# Patient Record
Sex: FEMALE | Race: AMERICAN INDIAN OR ALASKA NATIVE
[De-identification: names, ages, dates, MRNs, and addresses within clinical notes are randomized per-mention and may not be internally consistent; named-entity substitution may affect disease eponyms.]

---

## 2022-02-18 ENCOUNTER — HOSPITAL ENCOUNTER (EMERGENCY)
Dept: HOSPITAL 5 - ED | Age: 32
Discharge: HOME | End: 2022-02-18
Payer: MEDICAID

## 2022-02-18 VITALS — SYSTOLIC BLOOD PRESSURE: 117 MMHG | DIASTOLIC BLOOD PRESSURE: 73 MMHG

## 2022-02-18 DIAGNOSIS — O21.1: Primary | ICD-10-CM

## 2022-02-18 DIAGNOSIS — Z79.899: ICD-10-CM

## 2022-02-18 DIAGNOSIS — Z3A.08: ICD-10-CM

## 2022-02-18 LAB
ALBUMIN SERPL-MCNC: 5.2 G/DL (ref 3.9–5)
ALT SERPL-CCNC: 20 UNITS/L (ref 7–56)
BASOPHILS # (AUTO): 0 K/MM3 (ref 0–0.1)
BASOPHILS NFR BLD AUTO: 0.1 % (ref 0–1.8)
BUN SERPL-MCNC: 27 MG/DL (ref 7–17)
BUN/CREAT SERPL: 34 %
CALCIUM SERPL-MCNC: 10.8 MG/DL (ref 8.4–10.2)
EOSINOPHIL # BLD AUTO: 0 K/MM3 (ref 0–0.4)
EOSINOPHIL NFR BLD AUTO: 0 % (ref 0–4.3)
HCT VFR BLD CALC: 46.2 % (ref 30.3–42.9)
HEMOLYSIS INDEX: 4
HGB BLD-MCNC: 14.8 GM/DL (ref 10.1–14.3)
LYMPHOCYTES # BLD AUTO: 1.4 K/MM3 (ref 1.2–5.4)
LYMPHOCYTES NFR BLD AUTO: 7.1 % (ref 13.4–35)
MCHC RBC AUTO-ENTMCNC: 32 % (ref 30–34)
MCV RBC AUTO: 90 FL (ref 79–97)
MONOCYTES # (AUTO): 0.8 K/MM3 (ref 0–0.8)
MONOCYTES % (AUTO): 4.1 % (ref 0–7.3)
PLATELET # BLD: 286 K/MM3 (ref 140–440)
RBC # BLD AUTO: 5.14 M/MM3 (ref 3.65–5.03)

## 2022-02-18 PROCEDURE — 99283 EMERGENCY DEPT VISIT LOW MDM: CPT

## 2022-02-18 PROCEDURE — 96361 HYDRATE IV INFUSION ADD-ON: CPT

## 2022-02-18 PROCEDURE — 83690 ASSAY OF LIPASE: CPT

## 2022-02-18 PROCEDURE — 80053 COMPREHEN METABOLIC PANEL: CPT

## 2022-02-18 PROCEDURE — 96375 TX/PRO/DX INJ NEW DRUG ADDON: CPT

## 2022-02-18 PROCEDURE — 96374 THER/PROPH/DIAG INJ IV PUSH: CPT

## 2022-02-18 PROCEDURE — 36415 COLL VENOUS BLD VENIPUNCTURE: CPT

## 2022-02-18 PROCEDURE — 85025 COMPLETE CBC W/AUTO DIFF WBC: CPT

## 2022-02-18 NOTE — EMERGENCY DEPARTMENT REPORT
ED Female  HPI





- General


Chief complaint: Nausea/Vomiting/Diarrhea


Stated complaint: NAUSEA,VOMITTING


Time Seen by Provider: 22 10:53


Source: patient


Mode of arrival: Ambulatory


Limitations: No Limitations





- History of Present Illness


Initial comments: 





31-year-old -American female presents to the emergency room reporting 

that she has a history of hyperemesis gravidarum with her last pregnancies.  She

states that she is currently 8 weeks pregnant.  She is  4 para 2 with 1 

miscarriage recently.  Patient is followed by my OB in [place].  She denies 

any vaginal bleeding no vaginal discharge no pelvic pain.  She states that in t

he past she has been on multiple antiemetics.  Her last menstrual period was 

2021


Onset/Timin


-: week(s)


Severity scale (0 -10): 2


Consistency: intermittent


Improves with: none


Worsens with: none


Are you Pregnant Now?: Yes


Last Menstrual Period: 21


EDC: 10/03/22


Associated Symptoms: abdominal pain (Epigastric from vomiting)





- Related Data


Sexually active: Yes


: 4


Para: 1


A: 1


                                  Previous Rx's











 Medication  Instructions  Recorded  Last Taken  Type


 


Doxylamine Succinate [Unisom] 25 mg PO BID PRN #20 22 Unknown Rx


 


Fariba Root [Fariba] 250 mg PO QID PRN #40 cap 22 Unknown Rx


 


Pyridoxine [Vitamin B-6 50MG TAB] 50 mg PO DAILY #30 tab 22 Unknown Rx











                                    Allergies











Allergy/AdvReac Type Severity Reaction Status Date / Time


 


No Known Allergies Allergy   Verified 22 10:23














ED Review of Systems


ROS: 


Stated complaint: NAUSEA,VOMITTING


Other details as noted in HPI





Comment: All other systems reviewed and negative


Gastrointestinal: nausea, vomiting





ED Past Medical Hx





- Medications


Home Medications: 


                                Home Medications











 Medication  Instructions  Recorded  Confirmed  Last Taken  Type


 


Doxylamine Succinate [Unisom] 25 mg PO BID PRN #20 22  Unknown Rx


 


Fariba Root [Fariba] 250 mg PO QID PRN #40 cap 22  Unknown Rx


 


Pyridoxine [Vitamin B-6 50MG TAB] 50 mg PO DAILY #30 tab 22  Unknown Rx














ED Physical Exam





- General


Limitations: No Limitations


General appearance: alert, in no apparent distress





- Head


Head exam: Present: atraumatic, normocephalic





- Eye


Eye exam: Present: normal appearance





- ENT


ENT exam: Present: mucous membranes moist





- Neck


Neck exam: Present: normal inspection





- Respiratory


Respiratory exam: Present: normal lung sounds bilaterally.  Absent: respiratory 

distress





- Cardiovascular


Cardiovascular Exam: Present: regular rate, normal rhythm.  Absent: systolic 

murmur, diastolic murmur, rubs, gallop





- GI/Abdominal


GI/Abdominal exam: Present: soft, normal bowel sounds





- Extremities Exam


Extremities exam: Present: normal inspection





- Back Exam


Back exam: Present: normal inspection





- Neurological Exam


Neurological exam: Present: alert, oriented X3





- Psychiatric


Psychiatric exam: Present: normal affect, normal mood





- Skin


Skin exam: Present: warm, dry, intact, normal color.  Absent: rash





ED Course


                                   Vital Signs











  22





  10:25 13:30 13:38


 


Temperature 98.4 F  98.0 F


 


Pulse Rate 130 H  115 H


 


Respiratory 18 17 20





Rate   


 


Blood Pressure 115/76  


 


Blood Pressure   99/47





[Right]   


 


O2 Sat by Pulse 100  99





Oximetry   














  22





  14:14


 


Temperature 98.2 F


 


Pulse Rate 106 H


 


Respiratory 18





Rate 


 


Blood Pressure 


 


Blood Pressure 94/67





[Right] 


 


O2 Sat by Pulse 100





Oximetry 














- Reevaluation(s)


Reevaluation #1: 





22 15:01


Patient reports that she feels much better after having the second liter of 

fluids and Reglan.





ED Medical Decision Making





- Lab Data


Result diagrams: 


                                 22 11:15





                                 22 11:15





- Medical Decision Making





31-year-old -American female presents to the emergency room reporting 

that she has a history of hyperemesis gravidarum with her last pregnancies.  She

 states that she is currently 8 weeks pregnant.  She is  4 para 2 with 1 

miscarriage recently.  Patient is followed by my OB in [place].  She denies 

any vaginal bleeding no vaginal discharge no pelvic pain.  She states that in 

the past she has been on multiple antiemetics.  Her last menstrual period was 

2021





CBC CMP lipase urinalysis has been ordered.


Critical care attestation.: 


If time is entered above; I have spent that time in minutes in the direct care 

of this critically ill patient, excluding procedure time.








ED Disposition


Clinical Impression: 


 Hyperemesis gravidarum before end of 22 week gestation with carbohydrate 

depletion





Disposition:  HOME / SELF CARE / HOMELESS


Is pt being admited?: No


Does the pt Need Aspirin: No


Condition: Stable


Instructions:  Hyperemesis Gravidarum, Morning Sickness


Additional Instructions: 


Please take medications as prescribed.  Follow-up with your OB/GYN.


Prescriptions: 


Fariba Root [Fariba] 250 mg PO QID PRN #40 cap


 PRN Reason: Vomiting


Doxylamine Succinate [Unisom] 25 mg PO BID PRN #20


 PRN Reason: Nausea And Vomiting


Pyridoxine [Vitamin B-6 50MG TAB] 50 mg PO DAILY #30 tab


Referrals: 


[facility] [Primary Care Provider] - 3-5 Days


[facility] [Provider Group] - 3-5 Days


Time of Disposition: 15:05

## 2022-03-28 ENCOUNTER — HOSPITAL ENCOUNTER (INPATIENT)
Dept: HOSPITAL 5 - 3A | Age: 32
LOS: 2 days | Discharge: HOME | DRG: 781 | End: 2022-03-30
Attending: OBSTETRICS & GYNECOLOGY | Admitting: OBSTETRICS & GYNECOLOGY
Payer: MEDICAID

## 2022-03-28 ENCOUNTER — HOSPITAL ENCOUNTER (EMERGENCY)
Dept: HOSPITAL 5 - ED | Age: 32
Discharge: HOME | End: 2022-03-28
Payer: MEDICAID

## 2022-03-28 VITALS — SYSTOLIC BLOOD PRESSURE: 121 MMHG | DIASTOLIC BLOOD PRESSURE: 73 MMHG

## 2022-03-28 DIAGNOSIS — Z3A.13: ICD-10-CM

## 2022-03-28 DIAGNOSIS — O21.1: Primary | ICD-10-CM

## 2022-03-28 DIAGNOSIS — O99.281: ICD-10-CM

## 2022-03-28 DIAGNOSIS — E05.90: ICD-10-CM

## 2022-03-28 DIAGNOSIS — O21.9: Primary | ICD-10-CM

## 2022-03-28 DIAGNOSIS — Z53.21: ICD-10-CM

## 2022-03-28 LAB
ALBUMIN SERPL-MCNC: 4.3 G/DL (ref 3.9–5)
ALT SERPL-CCNC: 12 UNITS/L (ref 7–56)
BASOPHILS # (AUTO): 0 K/MM3 (ref 0–0.1)
BASOPHILS NFR BLD AUTO: 0.1 % (ref 0–1.8)
BUN SERPL-MCNC: 11 MG/DL (ref 7–17)
BUN/CREAT SERPL: 28 %
CALCIUM SERPL-MCNC: 9.8 MG/DL (ref 8.4–10.2)
EOSINOPHIL # BLD AUTO: 0 K/MM3 (ref 0–0.4)
EOSINOPHIL NFR BLD AUTO: 0 % (ref 0–4.3)
HCT VFR BLD CALC: 32.4 % (ref 30.3–42.9)
HEMOLYSIS INDEX: 0
HGB BLD-MCNC: 10.1 GM/DL (ref 10.1–14.3)
LYMPHOCYTES # BLD AUTO: 1 K/MM3 (ref 1.2–5.4)
LYMPHOCYTES NFR BLD AUTO: 6.8 % (ref 13.4–35)
MCHC RBC AUTO-ENTMCNC: 31 % (ref 30–34)
MCV RBC AUTO: 95 FL (ref 79–97)
MONOCYTES # (AUTO): 0.5 K/MM3 (ref 0–0.8)
MONOCYTES % (AUTO): 3.4 % (ref 0–7.3)
PLATELET # BLD: 327 K/MM3 (ref 140–440)
RBC # BLD AUTO: 3.41 M/MM3 (ref 3.65–5.03)

## 2022-03-28 PROCEDURE — 86901 BLOOD TYPING SEROLOGIC RH(D): CPT

## 2022-03-28 PROCEDURE — 86900 BLOOD TYPING SEROLOGIC ABO: CPT

## 2022-03-28 PROCEDURE — 81001 URINALYSIS AUTO W/SCOPE: CPT

## 2022-03-28 PROCEDURE — 80053 COMPREHEN METABOLIC PANEL: CPT

## 2022-03-28 PROCEDURE — 87086 URINE CULTURE/COLONY COUNT: CPT

## 2022-03-28 PROCEDURE — 84443 ASSAY THYROID STIM HORMONE: CPT

## 2022-03-28 PROCEDURE — 85025 COMPLETE CBC W/AUTO DIFF WBC: CPT

## 2022-03-28 PROCEDURE — 86850 RBC ANTIBODY SCREEN: CPT

## 2022-03-28 PROCEDURE — U0003 INFECTIOUS AGENT DETECTION BY NUCLEIC ACID (DNA OR RNA); SEVERE ACUTE RESPIRATORY SYNDROME CORONAVIRUS 2 (SARS-COV-2) (CORONAVIRUS DISEASE [COVID-19]), AMPLIFIED PROBE TECHNIQUE, MAKING USE OF HIGH THROUGHPUT TECHNOLOGIES AS DESCRIBED BY CMS-2020-01-R: HCPCS

## 2022-03-28 PROCEDURE — 36415 COLL VENOUS BLD VENIPUNCTURE: CPT

## 2022-03-28 PROCEDURE — 80048 BASIC METABOLIC PNL TOTAL CA: CPT

## 2022-03-28 PROCEDURE — 82010 KETONE BODYS QUAN: CPT

## 2022-03-28 RX ADMIN — PROMETHAZINE HYDROCHLORIDE SCH MG: 25 SUPPOSITORY RECTAL at 23:31

## 2022-03-28 RX ADMIN — DEXTROSE, SODIUM CHLORIDE, SODIUM LACTATE, POTASSIUM CHLORIDE, AND CALCIUM CHLORIDE SCH MLS/HR: 5; .6; .31; .03; .02 INJECTION, SOLUTION INTRAVENOUS at 23:32

## 2022-03-29 LAB
BILIRUB UR QL STRIP: (no result)
BLOOD UR QL VISUAL: (no result)
MUCOUS THREADS #/AREA URNS HPF: (no result) /HPF
PH UR STRIP: 6 [PH] (ref 5–7)
RBC #/AREA URNS HPF: 9 /HPF (ref 0–6)
UROBILINOGEN UR-MCNC: 2 MG/DL (ref ?–2)
WBC #/AREA URNS HPF: 15 /HPF (ref 0–6)

## 2022-03-29 RX ADMIN — PROMETHAZINE HYDROCHLORIDE SCH MG: 25 SUPPOSITORY RECTAL at 16:47

## 2022-03-29 RX ADMIN — PROMETHAZINE HYDROCHLORIDE SCH MG: 25 SUPPOSITORY RECTAL at 10:45

## 2022-03-29 RX ADMIN — Medication SCH EACH: at 10:45

## 2022-03-29 RX ADMIN — DEXTROSE, SODIUM CHLORIDE, SODIUM LACTATE, POTASSIUM CHLORIDE, AND CALCIUM CHLORIDE SCH MLS/HR: 5; .6; .31; .03; .02 INJECTION, SOLUTION INTRAVENOUS at 02:03

## 2022-03-29 RX ADMIN — PROMETHAZINE HYDROCHLORIDE SCH MG: 25 SUPPOSITORY RECTAL at 05:00

## 2022-03-29 NOTE — HISTORY AND PHYSICAL REPORT
History of Present Illness


Date of examination: 22


Date of admission: 


22 22:41





Chief complaint: 


I have not been able to keep down water since  morning. 





History of present illness: 


Pt is a  @ 13.1 wks who states that she has not been able to keep down 

solids and liquids since  morning. Of Note: Pt admitted from the ER under 

a different account number ([id number]). Her K+ in the ER this admission was 

4.1.  Per patient she is not able to urinate and when she does it is very dark 

in color. She was previously admitted about 3 weeks ago () for 

hyperemesis and was diagnosed with hyperthyroidism during that admission. Has 

endocrinology appointment on . Has not called [facility] for an appointment. 

States that she would like to wait until she sees the endocrinologist so that 

she can go the [facility] appointment with the information about her thyroid disorder.

 Has not been taking medications since a week after discharge because she states

that she felt better. Last took propranolol and PTU on  and has 

not taken them since that time. States that her heart rate had decreased and she

felt better and that is why she stopped taking her medication. 





EDC Calculations 


LMP: 10/03/2022


Gestational Age: 13.1  weeks on admission





Past Pregnancy History 


   :      4


   Term Births:      2


   Premature Births:   0


   Living Children:   2


   Para:         2


   Mult. Births:      0


   Prev :   0


   Prev.  attempt?   0


   Aborta:      1


   Elect. Ab:      0


   Spont. Ab:      1


   Ectopics:      0





Pregnancy # 1


   Delivery date:     


   Weeks Gestation:   38


    labor:     no


   Delivery type:     


   Anesthesia type:     none


   Infant Sex:      Female


   Birth weight:      5lbs?oz


   Comments:      HG





Pregnancy # 2


   Delivery date:     


   Weeks Gestation:   38


   Delivery type:     


   Anesthesia type:     none


   Infant Sex:      Male


   Birth weight:      6lbs


   Comments:      HG





Pregnancy # 3


   Delivery date:     2021


   Weeks Gestation:   18


   Delivery type:     SAB


   Anesthesia type:     general


   Delivery location:     [place]


   Comments:      HG, D&C








Past Medical History:


   Reviewed and updated today:


      Ovarian Cysts





Past Surgical History:


   Reviewed and updated today:


      negative











Family History Summary: 


MGF - Has Family History of Prostate Cancer - Entered On: 2022


MGF - Has Family History of Hypertension - Entered On: 2022


MGM - Has Family History of Hypertension - Entered On: 2022


MGF - Has Family History of Diabetes - Entered On: 2022


MGM - Has Family History of Diabetes - Entered On: 2022








Social History:


   Patient is single


   


   Smoking History:


   Patient has never smoked.








Risk Factors: 


Smoked Tobacco Use:  Never smoker


Smokeless Tobacco Use:  Never


Counseled to Quit/Cut Down:  yes


Passive Smoke Exposure:  no


HIV High Risk Behavior:  low risk


Caffeine Use:  0 drinks per day


Exercise:  no


Seatbelt Use:  preg- %





No Dietary Counseling Reason: pn yes





Alcohol Use:  yes


   Type:  occ prior to preg.





Drug Use:  no








Past Medical History 


Anesthesia Complications: negative


Anemia: negative


Autoimmune Disorder: negative


Bleeding Disorder: negative


Blood Transfusions: negative


Breast Disease: negative


Diabetes: negative


Heart Disease: negative


Hypertension: negative


Hepatitis/Liver Disease: negative


Kidney Disease/UTI: negative


Neurologic/Epilepsy/Migraines: negative


Phlebitis/Varicosities: negative


Psychiatric: negative


Pulmonary Disease/Asthma: negative


Thyroid Disease: negative


Hospitalizations: negative


Surgery (Non-gyn): negative


Abnormal PAP: negative


MONIE Exposure: negative


Infertility: negative


Uterine Anomaly: negative


Uterine Surgery (not C/S): negative


Other Gynecologic Problems: negative





Social Hx: Patient is single





Smoking History:


Patient has never smoked.








Infection History 


Hx of STD: none


HIV Risk Eval: low risk


Hepatitis B Risk Eval: low risk


Rash, Viral, or Febrile illness since last LMP? no


Varicella/Chicken Pox Status: Previous Disease


TB Risk: no





Genetic History 


 Congenital Heart Defect:


    Mom: no  Dad: no


Canavan Disease:


    Mom: no  Dad: no


Thalassemia


    Mom: no  Dad: no


Neural Tube Defect


    Mom: no  Dad: no


Down's Syndrome


    Mom: no  Dad: no


Shashi-Sachs


    Mom: no  Dad: no


Sickle Cell Disease/Trait


    Mom: no  Dad: no


Hemophilia


    Mom: no  Dad: no


Muscular Dystrophy


    Mom: no  Dad: no


Cystic Fibrosis


    Mom: no  Dad: no


Randolph Chorea


    Mom: no  Dad: no


Mental Retardation


    Mom: no  Dad: no


Fragile X


    Mom: no  Dad: no


Other Genetic/Chromosomal Disorder


    Mom: no  Dad: no


Child w/other birth defect


    Mom: no  Dad: no





Enviromental Exposures 


 Enviromental Exposures Reviewed


Xray Exposure: no


Medication, drug, or alcohol use since LMP: no


Chemical/Other Exposure: no


Exposure to Cat Liter: no


Hx of Parvovirus (Fifth Disease): no


Occupational Exposure to Children: none


Comments:  Unemployed


Active Medications (reviewed today):


None





Current Allergies (reviewed today):


No known allergies











Past History


Past Medical History: thyroid disease (Hyperthyroidism)


Past Surgical History: no surgical history


Family/Genetic History: none


Social history: no significant social history





- Obstetrical History


Expected Date of Delivery: 10/03/22


Actual Gestation: 13 Week(s) 1 Day(s) 


: 4


Para: 2


Hx # Term Pregnancies: 2


Number of  Pregnancies: 0


Spontaneous Abortions: 1


Induced : 0


Number of Living Children: 2





Medications and Allergies


                                    Allergies











Allergy/AdvReac Type Severity Reaction Status Date / Time


 


No Known Allergies Allergy   Verified 22 10:23











                                Home Medications











 Medication  Instructions  Recorded  Confirmed  Last Taken  Type


 


Doxylamine Succinate [Unisom] 25 mg PO BID PRN #20 22 

22:00 Rx


 


Fariba Root [Fariba] 250 mg PO QID PRN #40 cap 22 18:00 

Rx


 


Pyridoxine [Vitamin B-6 50MG TAB] 50 mg PO DAILY #30 tab 22 

Unknown Rx


 


Metoclopramide [Reglan] 10 mg PO TID #60 tab 22 10:00 Rx


 


Ondansetron [Zofran ODT TAB] 8 mg PO Q12HR #15 tab.rapdis 22 21:00 Rx


 


Promethazine [Phenergan SUPPOS] 25 mg NV Q6HR PRN #15 supp.rect 22 10:00 Rx


 


propranoloL [Inderal] 20 mg PO BID #60 tablet 22 10:00 

Rx


 


propylthiouraciL [Propylthiouracil] 50 mg PO TID #90 tab 22 10:00 Rx











Active Meds: 


Active Medications





Dextrose/Lactated Ringer's (D5lr)  1,000 mls @ 500 mls/hr IV AS DIRECT HIMANSHU


   Stop: 22 23:59


   Last Admin: 22 23:32 Dose:  500 mls/hr


   


Metoclopramide HCl (Metoclopramide 10 Mg/2 Ml Inj)  10 mg IV Q6H PRN


   PRN Reason: Nausea And Vomiting


Multivitamins/Iron/Calcium (Prenatal Jfb04-Vb Fumarate-Folic Acid Vit Tab)  1 

each PO QDAY [facility]


Ondansetron HCl (Ondansetron 4 Mg/2 Ml Inj)  4 mg IV Q6H PRN


   PRN Reason: N/V unrelieved by Reglan


Promethazine HCl (Promethazine 25 Mg Rect Supp)  25 mg NV Q6H [facility]


   Last Admin: 22 23:31 Dose:  25 mg


   


Propylthiouracil (Propylthiouracil 50 Mg Tab)  50 mg PO TID [facility]











Review of Systems


All systems: negative


Gastrointestinal: nausea, vomiting





- Vital Signs


Vital signs: 


                                   Vital Signs











Pulse Ox


 


 99 


 


 22 23:00








                                        











Temp Pulse Resp BP Pulse Ox


 


             99 


 


             22 23:00








Pt denies vaginal bleeding. Pale and dry mucus membranes noted.  





- Physical Exam


Breasts: Positive: deferred


Cardiovascular: Normal S1, Normal S2


Lungs: Positive: Clear to auscultation


Abdomen: Positive: normal appearance, soft, other (Hypoactive bowel sounds)


Uterus: Positive: normal size, normal contour


Extremities: Positive: normal





Results


All other labs normal.








HBsAg Screen              Negative                    Negative         *1


  RPR                       Non Reactive                Non Reactive     *2


 Rubella Antibodies, IgG


                            2.72 index                  Immune >0.99     *3


                                    Non-immune       <0.90


                                Equivocal  0.90 - 0.99


                                Immune           >0.99


   


  ABO Grouping              O                                            *4


  Rh Factor                 Positive                                     *5


    Please note: Prior records for this patient's ABO / Rh type are not


available for additional verification.


   


  Antibody Screen           Negative                    Negative         *6


  WBC                  [H]  16.2 x10E3/uL               3.4-10.8         *7


  RBC                       4.71 x10E6/uL               3.77-5.28        *8


  Hemoglobin                14.0 g/dL                   11.1-15.9        *9


  Hematocrit                42.4 %                      34.0-46.6        *10


  MCV                       90 fL                       79-97            *11


  MCH                       29.7 pg                     26.6-33.0        *12


  MCHC                      33.0 g/dL                   31.5-35.7        *13


  RDW                  [L]  10.5 %                      11.7-15.4        *14


  Platelets                 299 x10E3/uL                150-450          *15


  Neutrophils               84 %                        Not Estab.       *16


  Lymphs                    12 %                        Not Estab.       *17


  Monocytes                 4 %                         Not Estab.       *18


  Eos                       0 %                         Not Estab.       *19


  Basos                     0 %                         Not Estab.       *20


! Immature Cells            <No Reported Value>                          *21


 Neutrophils (Absolute)


                       [H]  13.5 x10E3/uL               1.4-7.0          *22


  Lymphs (Absolute)         1.9 x10E3/uL                0.7-3.1          *23


  Monocytes(Absolute)       0.7 x10E3/uL                0.1-0.9          *24


  Eos (Absolute)            0.0 x10E3/uL                0.0-0.4          *25


  Baso (Absolute)           0.0 x10E3/uL                0.0-0.2          *26


! Immature Granulocytes


                            0 %                         Not Estab.       *27


! Immature Grans (Abs)      0.0 x10E3/uL                0.0-0.1          *28


! NRBC                      <No Reported Value>                          *29


  Hematology Comments:      <No Reported Value>                          *30





Tests: (2) Hematopath Consultation, Smear ([id number])


! WBC                  [A]  FHW6                                         *31


    Leukocytosis, absolute neutrophilia type. Reactivity noted. Blasts


not seen.


   


! RBC                       NOMOA                                        *32


    No morphologic abnormality was detected on the Partida stained smear.


   


! PLTs                      NOMOA                                        *33


    No morphologic abnormality was detected on the Partida stained smear.


   


! Comments/Recommendations


                            <No Reported Value>                          *34


! Pathologist               RAG                                          *35


    Reviewed by: Robe Whitehead MD, Pathologist


   





Tests: (3) SMN1 Copy Number Analysis ([id number])


! Genetic Counselor:        Not applicable                               *36


! Specimen Type:            SPRCS                                        *37


    Peripheral Blood


   


! Ethnicity:                SPRCS                                        *38


    Not Provided


   


! Indication:               SPRCS                                        *39


    Not Provided


   


! SMA Results:              Note                                         *40


    Disease (Gene)            Results     Interpretation


Spinal muscular atrophy   NEGATIVE    2 copies of SMN1;


(SMN1)                                negative for


                                      c.*3+80T>G SNP. This


                                      result reduces, but


                                      does not eliminate the


                                      risk to be a carrier.


                                      Information regarding


                                      clinical indication


                                      may provide a more


                                      detailed


                                      interpretation. For


                                      ethnic-specific risk


                                      revisions with no


                                      family history see


                                      Information Table.


   


! General Comments          Note                                         *41


    Genetic counseling services are available. To access


[facility] please visit


[email or web address] or call


[phone] ([phone]).


   


! Additional Clinical Info


                            Note                                         *42


    Spinal muscular atrophy (SMA) is an autosomal recessive


neurodegenerative disorder with variable age at onset and


severity, characterized by progressive degeneration of


the lower motor neurons in the spinal cord and brain


stem, leading to muscle weakness, and in its most common


form, respiratory failure by age two. Complications of


SMA may include poor weight gain, sleep difficulties,


pneumonia, scoliosis, and joint deformities. In severely


affected individuals, abnormal fetal ultrasound findings


may include congenital joint contractures,


polyhydramnios, and decreased fetal movement


(Wilson, PMID:[id number]). Treatment is supportive.


Targeted therapies may be available for some individuals.


Approximately 94% of affected individuals have 0 copies


of the SMN1 gene; in these individuals an increase in the


number of copies of the SMN2 gene correlates with reduced


disease severity (Chante, PMID:[id number]). Individuals


with one copy of the SMN1 gene are predicted to be


carriers of SMA; those with two or more copies have a


reduced carrier risk. For individuals with two copies of


the SMN1 gene, the presence or absence of the variant


c.*3+80T>G correlates with an increased or decreased


risk, respectively, of being a silent carrier (2+0) (Nilton,


PMID [id number]; Shaun, PMID [id number]).


   


! Method/Limitations:       Note                                         *43


    Spinal muscular atrophy:  The copy number of SMN1 exon 7 is


assessed relative to internal standard reference genes by


quantitative polymerase chain reaction (qPCR). A


mathematical algorithm calculates 0, 1, 2 and 3 copies with


statistical confidence. When no copies of SMN1 are detected,


the primer and probe binding sites are sequenced to rule out


variants that could interfere with copy number analysis and


SMN2 copy number is assessed by digital droplet PCR analysis


relative to an internal standard reference gene. For carrier


screening, when two copies of SMN1 are detected, allelic


discrimination qPCR targeting c.*3+80TG in SMN1  is


performed.


Limitations: False positive or false negative results may


occur for reasons that include genetic variants, blood


transfusions, bone marrow transplantation, somatic or


tissue-specific mosaicism, mislabeled samples, or erroneous


representation of family relationships.


   


! Information Table         Note                                         *44


    SMA risk reductions for individuals with no family history


Disorder (Gene) Reference Sequence


Spinal Muscular Atrophy (SMN1) [id number]


Population Detection Pre-test Post-test risk of   Post-test


           Rate      carrier  being a carrier     risk of


           (Copy     risk     with 2 copies**     being a


           number +                               carrier


           SNP)               POSITIVE   NEGATIVE with 3


                              for the    for the  copies


                              c.*3+80T>G c.*3+80T>G


                              SNP        SNP


    90.3%     1 in 72  1 in 34    1 in 375  1 in 4200


American


Ashkenazi  92.8%     1 in 67  High risk  1 in 918  1 in 5400


Pentecostalism


      93.6%     1 in 59  High risk  1 in 907  1 in 5600


  95.0%     1 in 47  1 in 29    1 in 921  1 in 5600


   92.6%     1 in 68  1 in 140   1 in 906  1 in 5400


Mixed or       For counseling purposes, consider using the


Other ethnic   ethnic background with the most conservative


Background     risk estimates.


** includes carriers who are silent carriers (2+0) and


Carriers with a pathogenic variant not detected in this


Assay


Shaun, PMID [id number]; Catalina, PMID [id number]; Nakul, PMID


[id number]


   


! Disclaimer:               Note                                         *45


    This test was developed and its performance characteristics


determined by [facility].  It has


not been cleared or approved by the Food and Drug


Administration.


[facility] is a business unit of [facility], a wholly-owned subsidiary of [employer]. [employer] is a


registered service shana of [facility] [employer].


Testing performed at [facility],


[address] [facility], [place] [address] Prisca Douglas, PhD,


[facility],  [phone]


This document contains private and confidential information


protected by state and federal law. If you have received


this document in error, please call [phone]


   


! Director Review           Note                                         *46


    Jaci Goldberg, Ph.D., [facility]


   





Tests: (4) Cystic Fibrosis Profile ([id number])


! CF, Screen                Comment:                                     *47


    RESULTS: Negative for 32 mutations analyzed


INTERPRETATION:


This individual is negative for the mutations analyzed.


This negative result may need further interpretation


depending on the clinical indication.  This result reduces


but does not eliminate the risk to be a CF carrier.


COMMENTS:


The detection rate varies with ethnicity and is listed


below.  The presence of an undetected mutation in the


CF gene cannot be ruled out.  In the absence of


family history, the remaining risk that a person with


a negative result could have at least one CF mutation


is listed in the table.  If there is a family history


of CF, these risk figures do not apply.  As detailed


information regarding this individual's family


history would permit a more accurate assessment of


this individual's risk to be a carrier of cystic


fibrosis, please contact [facility] at


[phone] for a revised report.


Mutation Detection  Detection rates are based on mutation


Rates among Ethnic  frequencies in patients affected with


Groups              cystic fibrosis.  Among individuals


                    with an atypical or mild presentation


                    (e.g. congenital absence of the vas


                    deferens, pancreatitis) detection


                   rates may vary from those provided here:


                   Carrier risk


                  reduction when no


                   family history        Detection


Ethnicity                                   Rate


Ashkenazi            to             97%


Pentecostalism


            to             90%


(non-)


-American     to             69%


             to             73%


                to             55%


This interpretation is based on the clinical and family


relationship information provided and the current


understanding of the molecular genetics of this condition.


MUTATIONS ANALYZED:


G85E        V520F    B8466R         2183AA to G


R117H       G542X    R4857J         2184delA


R334W       S549N    394delTT       2789+5G to A


R347H       S549R    621+1G to T    3120+1G to A


R347P       G551D    711+1G to T    3659delC


A455E       R553X    1078delT       3849+10kbC to T


[id number]   R560T    1717-1G to A   3876delA


[id number]   J8248N   1898+1G to A   3905insT


METHODS/LIMITATIONS:


DNA is isolated from the sample and tested for the 32 CF


mutations on the Universal Array Platform ([employer]).


Regions of the CFTR gene are amplified enzymatically and


subjected to a solution-phase multiplex allele-specific


primer extension with subsequent hybridization to a bead


array and fluorescence detection.  Polymorphisms F508C,


I506V and I507V are included in this panel to rule out


false positive [id number] homozygotes.  Reflex testing of


5T is included in the panel for R117H interpretation.


False positive or negative results may occur for reasons


that include genetic variants, blood transfusions, bone


marrow transplantation, erroneous representation of


family relationships or contamination of a fetal sample


with maternal cells.


REFERENCES:


1. Updates on Carrier Screening for Cystic Fibrosis. (2011)


   Am J Ob Gynecol 117(4):9254-5292


2. Ad et al. (2004) [place] Med 6:387-91


3. Joseph et al. (2002) [place] Med 4:379-391


4. Preconception and prenatal carrier screening for cystic


   fibrosis: (2001)ACOG.ACMG publication


Results Released By: Hamilton Martel, Ph.D. Technical


Director Report Released By: Hamilton Martel, Ph.D.





   


! Comment:                  SPRCS                                        *48


    The assay provides information intended to be used for carrier


screening in adults of reproductive age, as an aid in 


screening, and as a confirmatory test for another medically


established diagnosis in newborns and children. The test is not


indicated for use in fetal diagnostic testing, pre-implantation


screening, or for any stand-alone diagnostic purposes without


confirmation by another medically established diagnostic product


or procedure.


   





Tests: (5) HIV Ab/p24 Ag with Reflex ([id number])


  HIV Ab/p24 Ag Screen      Non Reactive                Non Reactive     *49


    HIV Negative


HIV-1/HIV-2 antibodies and HIV-1 p24 antigen were NOT detected.


There is no laboratory evidence of HIV infection.


   





Tests: (6) HCV Antibody reflex to DAMIEN ([id number])


  HCV Ab                    <0.1 s/co ratio             0.0-0.9          *50





Tests: (7) Interpretation: ([id number])


! Interpretation:           SPRCS                                        *51


    Negative


Not infected with HCV, unless recent infection is suspected or other


evidence exists to indicate HCV infection.


   


    








Assessment and Plan


A: 31 y.o.  @ 13.1 wks, hyperemesis. Hyperthyroidism. 








- Patient Problems


(1) Hyperemesis gravidarum


Current Visit: No   Status: Acute   


Plan to address problem: 


Admit mother/baby for observation d/t hyperemesis.


 Initiate IV.


 Draw admission labs.


 IV fluids hydration.


 Clear liquid diet. 


 Advance diet as tolerate.


 Daily weights. 








(2) Hyperthyroidism affecting pregnancy


Current Visit: No   Status: Acute   


Qualifiers: 


   Trimester: second trimester   Qualified Code(s): O99.282 - Endocrine, 

nutritional and metabolic diseases complicating pregnancy, second trimester; 

E05.90 - Thyrotoxicosis, unspecified without thyrotoxic crisis or storm   


Plan to address problem: 


Restart PTU 50mg TID. 


 Draw TSH on admission.

## 2022-03-29 NOTE — PROGRESS NOTE
Assessment and Plan


  patient resting in bed, reports feels better. states she is able to tolerate 

clear liquids. Discussed if she continues to feel well we can try a bland soft 

diet in the near future. All questions addressed, rn to send down urine for 

ketone check. 








- Patient Problems


(1) First trimester pregnancy


Current Visit: No   Status: Acute   





(2) Hyperemesis gravidarum


Current Visit: No   Status: Acute   





(3) Hyperthyroidism affecting pregnancy


Current Visit: No   Status: Acute   


Qualifiers: 


   Trimester: second trimester   Qualified Code(s): O99.282 - Endocrine, 

nutritional and metabolic diseases complicating pregnancy, second trimester; 

E05.90 - Thyrotoxicosis, unspecified without thyrotoxic crisis or storm   





Subjective





- Subjective


Date of service: 03/29/22


Principal diagnosis: IUP @ 13+2; Hyperemesis, hyperthyroid





Objective





- Vital Signs


Vital Signs: 


                               Vital Signs - 12hr











  03/29/22 03/29/22 03/29/22





  04:29 08:35 12:01


 


Temperature 98.3 F 97.9 F 98.1 F


 


Pulse Rate 82 88 85


 


Respiratory 16 18 18





Rate   


 


Blood Pressure 107/67 117/68 107/56





[Left]   


 


O2 Sat by Pulse 100 100 100





Oximetry   














- Exam


Cardiovascular: Regular rate


Lungs: Normal air movement


Abdomen: Present: normal appearance, soft


Extremities: normal





- Labs


Labs: 


                                  Abnormal Labs











  03/29/22 03/29/22





  02:20 04:21


 


TSH   0.095 L


 


Urine WBC (Auto)  15.0 H 








                         Laboratory Results - last 24 hr











  03/28/22 03/29/22 03/29/22





  23:20 02:20 02:20


 


TSH   


 


Urine Color   Clarice 


 


Urine Turbidity   Slightly-cloudy 


 


Urine pH   6.0 


 


Ur Specific Gravity   1.025 


 


Urine Protein   100 mg/dl 


 


Urine Glucose (UA)   >=500 


 


Urine Ketones   80  80


 


Urine Blood   Neg 


 


Urine Nitrite   Neg 


 


Urine Bilirubin   Neg 


 


Urine Urobilinogen   2.0 


 


Ur Leukocyte Esterase   Tr 


 


Urine WBC (Auto)   15.0 H 


 


Urine RBC (Auto)   9.0 


 


U Epithel Cells (Auto)   13.0 


 


Urine Mucus   3+ 


 


SARS-CoV-2 (PCR)   


 


Blood Type  O POSITIVE  


 


Antibody Screen  Negative  














  03/29/22 03/29/22





  04:21 10:00


 


TSH  0.095 L 


 


Urine Color  


 


Urine Turbidity  


 


Urine pH  


 


Ur Specific Gravity  


 


Urine Protein  


 


Urine Glucose (UA)  


 


Urine Ketones  


 


Urine Blood  


 


Urine Nitrite  


 


Urine Bilirubin  


 


Urine Urobilinogen  


 


Ur Leukocyte Esterase  


 


Urine WBC (Auto)  


 


Urine RBC (Auto)  


 


U Epithel Cells (Auto)  


 


Urine Mucus  


 


SARS-CoV-2 (PCR)   Negative


 


Blood Type  


 


Antibody Screen

## 2022-03-30 VITALS — DIASTOLIC BLOOD PRESSURE: 62 MMHG | SYSTOLIC BLOOD PRESSURE: 111 MMHG

## 2022-03-30 LAB
BUN SERPL-MCNC: 3 MG/DL (ref 7–17)
BUN/CREAT SERPL: 8 %
CALCIUM SERPL-MCNC: 8.7 MG/DL (ref 8.4–10.2)
HEMOLYSIS INDEX: 3

## 2022-03-30 RX ADMIN — PROMETHAZINE HYDROCHLORIDE PRN MG: 25 TABLET ORAL at 18:22

## 2022-03-30 RX ADMIN — PROMETHAZINE HYDROCHLORIDE PRN MG: 25 TABLET ORAL at 09:01

## 2022-03-30 RX ADMIN — Medication SCH EACH: at 09:01

## 2022-03-30 NOTE — DISCHARGE SUMMARY
Providers





- Providers


Date of Admission: 


22 22:41





Date of discharge: 22


Attending physician: 


MELISSA ELIZALDE





                                        





22 21:41


Consult to Dietitian/Nutrition [CONS] Routine 


   Physician Instructions: 


   Reason For Exam: 


   Reason for Consult: hyper grav


   Reason for Consult: Poor oral intake











Primary care physician: 


PRIMARY CARE MD








Hospitalization


Reason for admission: IUP - 


Discharge diagnosis: other (IUP )


Condition at discharge: Good


Disposition:  HOME HEALTH CARE SERVICE





- Discharge Diagnoses


(1) Hyperemesis gravidarum


Status: Acute   Comment: Pt tolerating regular diet today without complaints. 

Discussed plan to discharge home to [facility] home care. Questions encouraged and 

addressed. Pt verbalizes understanding. Dr. Elizalde aware.   





(2) Hypokalemia


Status: Acute   





(3) 13 weeks gestation of pregnancy


Status: Acute   





Plan





- Discharge Medications


Prescriptions: 


Promethazine [Phenergan] 25 mg PO Q6H PRN #30 tablet


 PRN Reason: Nausea And Vomiting


Ondansetron [Zofran ODT TAB] 4 mg PO Q8H PRN #21 tab.rapdis


 PRN Reason: Nausea And Vomiting





- Provider Discharge Summary


Activity: routine


Diet: routine


Instructions: routine


Additional instructions: 


[]  Smoking cessation referral if applicable(refer to patient education folder 

for contact #)


[]  Refer to [facility]'s [facility] Booklet








Call your doctor immediately for:


* Fever > 100.5


* vaginal bleeding 


* Severe persistent headache


* Shortness of breath


* Reddened, hot, painful area to leg or breast


* contractions more than 6 times within 1 hour


* unable to tolerate fluids x24 hours


* urine dark colored and small amount


* 


Please call [phone] and schedule a follow up appointment in the next 2 

weeks. Thank you!








- Follow up plan


Follow up: 


PRIMARY CARE,MD [Primary Care Provider] - 7 Days


Forms:  [facility] Discharge Summary

## 2022-03-30 NOTE — PROGRESS NOTE
Assessment and Plan





A: hyperemesis @13wks gestation, tolerating clear diet


hypokalemia


P: potassium chloride 40MEQ once PO


advance diet as tolerated


antiemetics PO as ordered


Dr Elizalde aware





- Patient Problems


(1) Hyperemesis gravidarum


Current Visit: No   Status: Acute   





(2) Hypokalemia


Current Visit: No   Status: Acute   





(3) 13 weeks gestation of pregnancy


Current Visit: Yes   Status: Acute   





Subjective





- Subjective


Date of service: 03/30/22


Principal diagnosis: IUP @ 13+3; Hyperemesis, hyperthyroid


Patient reports: fetal movement normal, no new complaints, no loss of fluid, no 

vaginal bleeding, no contractions





Objective





- Vital Signs


Vital Signs: 


                               Vital Signs - 12hr











  03/30/22 03/30/22 03/30/22





  04:26 07:44 07:45


 


Temperature 98.5 F  98.0 F


 


Pulse Rate 90  96 H


 


Respiratory 20  18





Rate   


 


Blood Pressure 106/62  96/64


 


O2 Sat by Pulse 99 98 99





Oximetry   














- Exam


Breasts: deferred


Lungs: Normal air movement


Abdomen: Present: normal appearance, soft.  Absent: distention, tenderness, 

guarding


FHR comments: 





FHT's 140's via doppler on 3/29/22


Uterine Contraction Monitor Mode: Palpation


Uterine Tone Measurement Phase: Resting


Extremities: normal





- Labs


Labs: 


                                  Abnormal Labs











  03/29/22 03/29/22 03/30/22





  02:20 04:21 06:35


 


Sodium    134 L


 


Potassium    3.4 L


 


Carbon Dioxide    21 L


 


BUN    3 L


 


Creatinine    0.4 L


 


TSH   0.095 L 


 


Urine WBC (Auto)  15.0 H  








                         Laboratory Results - last 24 hr











  03/29/22 03/30/22 03/30/22





  17:30 06:35 07:00


 


Sodium   134 L 


 


Potassium   3.4 L 


 


Chloride   101.1 


 


Carbon Dioxide   21 L 


 


Anion Gap   15 


 


BUN   3 L 


 


Creatinine   0.4 L 


 


Estimated GFR   > 60 


 


BUN/Creatinine Ratio   8 


 


Glucose   94 


 


Calcium   8.7 


 


Urine Ketones  Neg   Neg

## 2022-09-19 ENCOUNTER — HOSPITAL ENCOUNTER (INPATIENT)
Dept: HOSPITAL 5 - TRG | Age: 32
LOS: 1 days | Discharge: HOME | End: 2022-09-20
Attending: OBSTETRICS & GYNECOLOGY | Admitting: OBSTETRICS & GYNECOLOGY
Payer: MEDICAID

## 2022-09-19 DIAGNOSIS — Z23: ICD-10-CM

## 2022-09-19 DIAGNOSIS — Z20.822: ICD-10-CM

## 2022-09-19 DIAGNOSIS — Z3A.38: ICD-10-CM

## 2022-09-19 LAB
HCT VFR BLD CALC: 36.1 % (ref 30.3–42.9)
HCT VFR BLD CALC: 38.5 % (ref 30.3–42.9)
HGB BLD-MCNC: 11.8 GM/DL (ref 10.1–14.3)
HGB BLD-MCNC: 12 GM/DL (ref 10.1–14.3)
MCHC RBC AUTO-ENTMCNC: 33 % (ref 30–34)
MCV RBC AUTO: 90 FL (ref 79–97)
PLATELET # BLD: 137 K/MM3 (ref 140–440)
RBC # BLD AUTO: 4 M/MM3 (ref 3.65–5.03)

## 2022-09-19 PROCEDURE — 86900 BLOOD TYPING SEROLOGIC ABO: CPT

## 2022-09-19 PROCEDURE — 86850 RBC ANTIBODY SCREEN: CPT

## 2022-09-19 PROCEDURE — 0HQ9XZZ REPAIR PERINEUM SKIN, EXTERNAL APPROACH: ICD-10-PCS | Performed by: OBSTETRICS & GYNECOLOGY

## 2022-09-19 PROCEDURE — U0003 INFECTIOUS AGENT DETECTION BY NUCLEIC ACID (DNA OR RNA); SEVERE ACUTE RESPIRATORY SYNDROME CORONAVIRUS 2 (SARS-COV-2) (CORONAVIRUS DISEASE [COVID-19]), AMPLIFIED PROBE TECHNIQUE, MAKING USE OF HIGH THROUGHPUT TECHNOLOGIES AS DESCRIBED BY CMS-2020-01-R: HCPCS

## 2022-09-19 PROCEDURE — 86592 SYPHILIS TEST NON-TREP QUAL: CPT

## 2022-09-19 PROCEDURE — 86901 BLOOD TYPING SEROLOGIC RH(D): CPT

## 2022-09-19 PROCEDURE — 36415 COLL VENOUS BLD VENIPUNCTURE: CPT

## 2022-09-19 PROCEDURE — 85027 COMPLETE CBC AUTOMATED: CPT

## 2022-09-19 PROCEDURE — 85014 HEMATOCRIT: CPT

## 2022-09-19 PROCEDURE — 85018 HEMOGLOBIN: CPT

## 2022-09-19 RX ADMIN — IBUPROFEN SCH MG: 800 TABLET, FILM COATED ORAL at 09:37

## 2022-09-19 RX ADMIN — IBUPROFEN SCH MG: 800 TABLET, FILM COATED ORAL at 16:05

## 2022-09-19 RX ADMIN — IBUPROFEN SCH MG: 800 TABLET, FILM COATED ORAL at 20:27

## 2022-09-19 NOTE — HISTORY AND PHYSICAL REPORT
History of Present Illness


Date of examination: 22


Date of admission: 


2022


Chief complaint: 





contractions


History of present illness: 





Pt presents in active labor


EDC Calculations 


LMP: 10/03/2022


Gestational Age:    weeks





Past Pregnancy History 


   :      4


   Term Births:      2


   Premature Births:   0


   Living Children:   2


   Para:         2


   Mult. Births:      0


   Prev :   0


   Prev.  attempt?   0


   Aborta:      1


   Elect. Ab:      0


   Spont. Ab:      1


   Ectopics:      0





Pregnancy # 1


   Delivery date:     


   Weeks Gestation:   38


    labor:     no


   Delivery type:     


   Anesthesia type:     none


   Infant Sex:      Female


   Birth weight:      5lbs?oz


   Comments:      HG





Pregnancy # 2


   Delivery date:     


   Weeks Gestation:   38


   Delivery type:     


   Anesthesia type:     none


   Infant Sex:      Male


   Birth weight:      6lbs


   Comments:      HG





Pregnancy # 3


   Delivery date:     2021


   Weeks Gestation:   18


   Delivery type:     SAB


   Anesthesia type:     general


   Delivery location:     [place]


   Comments:      [facility], D&C








Past Medical History:


   Reviewed and updated today:


      Ovarian Cysts





Past Surgical History:


   Reviewed and updated today:


      negative











Family History Summary: 


MGF - Has Family History of Prostate Cancer - Entered On: 2022


MGF - Has Family History of Hypertension - Entered On: 2022


MGM - Has Family History of Hypertension - Entered On: 2022


MGF - Has Family History of Diabetes - Entered On: 2022


MGM - Has Family History of Diabetes - Entered On: 2022








Social History:


   Patient is single


   


   Smoking History:


   Patient has never smoked.








Risk Factors: 


Smoked Tobacco Use:  Never smoker


Smokeless Tobacco Use:  Never


Counseled to Quit/Cut Down:  yes


Passive Smoke Exposure:  no


HIV High Risk Behavior:  low risk


Caffeine Use:  0 drinks per day


Exercise:  no


Seatbelt Use:  preg- %





No Dietary Counseling Reason: pn yes





Alcohol Use:  yes


   Type:  occ prior to preg.





Drug Use:  no








Past Medical History 


Anesthesia Complications: negative


Anemia: negative


Autoimmune Disorder: negative


Bleeding Disorder: negative


Blood Transfusions: negative


Breast Disease: negative


Diabetes: negative


Heart Disease: negative


Hypertension: negative


Hepatitis/Liver Disease: negative


Kidney Disease/UTI: negative


Neurologic/Epilepsy/Migraines: negative


Phlebitis/Varicosities: negative


Psychiatric: negative


Pulmonary Disease/Asthma: negative


Thyroid Disease: negative


Hospitalizations: negative


Surgery (Non-gyn): negative


Abnormal PAP: negative


MONIE Exposure: negative


Infertility: negative


Uterine Anomaly: negative


Uterine Surgery (not C/S): negative


Other Gynecologic Problems: negative





Social Hx: Patient is single





Smoking History:


Patient has never smoked.








Infection History 


Hx of STD: none


HIV Risk Eval: low risk


Hepatitis B Risk Eval: low risk


Rash, Viral, or Febrile illness since last LMP? no


Varicella/Chicken Pox Status: Previous Disease


TB Risk: no





Genetic History 


 Congenital Heart Defect:


    Mom: no  Dad: no


Canavan Disease:


    Mom: no  Dad: no


Thalassemia


    Mom: no  Dad: no


Neural Tube Defect


    Mom: no  Dad: no


Down's Syndrome


    Mom: no  Dad: no


Shashi-Sachs


    Mom: no  Dad: no


Sickle Cell Disease/Trait


    Mom: no  Dad: no


Hemophilia


    Mom: no  Dad: no


Muscular Dystrophy


    Mom: no  Dad: no


Cystic Fibrosis


    Mom: no  Dad: no


Dixie Chorea


    Mom: no  Dad: no


Mental Retardation


    Mom: no  Dad: no


Fragile X


    Mom: no  Dad: no


Other Genetic/Chromosomal Disorder


    Mom: no  Dad: no


Child w/other birth defect


    Mom: no  Dad: no





Enviromental Exposures 


 Enviromental Exposures Reviewed


Xray Exposure: no


Medication, drug, or alcohol use since LMP: no


Chemical/Other Exposure: no


Exposure to Cat Liter: no


Hx of Parvovirus (Fifth Disease): no


Occupational Exposure to Children: none


Comments:  Unemployed


Active Medications (reviewed today):


None





Current Allergies (reviewed today):


No known allergies


Laboratory Results 


Date/Time Collected: 2022





Routine Urinalysis 


Color: yellow


Appearance: clear


Leukocytes: negative


Nitrite: negative


Urobilinogen: negative


Protein: negative


Blood: negative


Ketone: negative


Bilirubin: negative


Glucose: negative


Urine HCG: positive








Past History


Past Medical History: other (see hpi)


Past Surgical History: other (see hpi)


Family/Genetic History: other (see hpi)


Social history: other (see hpi)





- Obstetrical History


Expected Date of Delivery: 10/03/22


Actual Gestation: 38 Week(s) 0 Day(s) 


: 4


Para: 3


Number of Living Children: 3





Medications and Allergies


                                    Allergies











Allergy/AdvReac Type Severity Reaction Status Date / Time


 


No Known Allergies Allergy   Verified 22 10:23











                                Home Medications











 Medication  Instructions  Recorded  Confirmed  Last Taken  Type


 


Doxylamine Succinate [Unisom] 25 mg PO BID PRN #20 22 

22:00 Rx


 


Fariba Root [Fariba] 250 mg PO QID PRN #40 cap 22 18:00 

Rx


 


Pyridoxine [Vitamin B-6 50MG TAB] 50 mg PO DAILY #30 tab 22 

Unknown Rx


 


Metoclopramide [Reglan] 10 mg PO TID #60 tab 22 10:00 Rx


 


Ondansetron [Zofran ODT TAB] 8 mg PO Q12HR #15 tab.rapdis 22 21:00 Rx


 


Promethazine [Phenergan SUPPOS] 25 mg [place] Q6HR PRN #15 supp.rect 22 10:00 Rx


 


propranoloL [Inderal] 20 mg PO BID #60 tablet 22 10:00 

Rx


 


propylthiouraciL [Propylthiouracil] 50 mg PO TID #90 tab 22 10:00 Rx


 


Ondansetron [Zofran ODT TAB] 4 mg PO Q8H PRN #21 tab.rapdis 22  Unknown Rx


 


Promethazine [Phenergan] 25 mg PO Q6H PRN #30 tablet 22  Unknown Rx














Review of Systems


All systems: negative





- Vital Signs


Vital signs: 


                                   Vital Signs











Temp


 


 97.9 F 


 


 22 06:03








                                        











Temp Pulse Resp BP Pulse Ox


 


 97.9 F   84      146/63   94 


 


 22 06:03  22 06:52     22 06:13  22 06:52














- Physical Exam


Cardiovascular: Normal S1, Normal S2


Lungs: Positive: Clear to auscultation, Normal air movement


Abdomen: Positive: normal appearance, soft, normal bowel sounds, abnormal bowel 

sounds.  Negative: distention, tenderness, guarding


Genitourinary (Female): Positive: normal external genitalia, normal perenium.  

Negative: perineal/vulvar lesions





- Obstetrical


FHR: auscultation normal, category 1





Results


Result Diagrams: 


                                 22 06:45





All other labs normal.








Assessment and Plan





- Patient Problems


(1) Active labor at term


Current Visit: Yes   Status: Acute   


Plan to address problem: 


admit


anticipate 


unable to get records so will treat for GBS unknown


does not desire epidural

## 2022-09-19 NOTE — PROCEDURE NOTE
OB Delivery Note





- Delivery


Date of Delivery: 22


Surgeon: MELISSA RICKS


Estimated blood loss: 200cc





- Vaginal


Delivery presentation: vertex


Delivery position: OA


Intrapartum events: precipitous labor- <3hr


Delivery induction: none


Delivery monitor: external uterine, internal FHT


Delivery placenta: spontaneous


Episiotomy: none


Delivery laceration: 1st degree (perineal)


Delivery repair: vicryl (3-0 )


Anesthesia: local


Delivery comments: 





Pt proressed to c/c/2+ and pushed the baby out with one push while on her side. 

She was then turned to her back. ant shoulder and rest of infant delivered w/o 

difficulty. Infant placed on maternal abdomen. Cord clamped x 2 and cut x1 by 

fob. Placenta delivered spontaneously intact. Laceration repaired in usual 

fashion with 2-0 vicrly.





- Infant


  ** A


Apgar at 1 minute: 7


Apgar at 5 minutes: 9


Infant Gender: Female ([id number])

## 2022-09-20 VITALS — SYSTOLIC BLOOD PRESSURE: 114 MMHG | DIASTOLIC BLOOD PRESSURE: 78 MMHG

## 2022-09-20 PROCEDURE — 3E0234Z INTRODUCTION OF SERUM, TOXOID AND VACCINE INTO MUSCLE, PERCUTANEOUS APPROACH: ICD-10-PCS | Performed by: OBSTETRICS & GYNECOLOGY

## 2022-09-20 RX ADMIN — IBUPROFEN SCH: 800 TABLET, FILM COATED ORAL at 10:32

## 2022-09-20 RX ADMIN — IBUPROFEN SCH MG: 800 TABLET, FILM COATED ORAL at 03:53

## 2022-09-20 NOTE — DISCHARGE SUMMARY
Providers





- Providers


Date of Admission: 


22 07:09





Date of discharge: 22


Attending physician: 


DANNY DAVIDSON





Primary care physician: 


DANNY DAVIDSON








Hospitalization


Reason for admission: active labor


Delivery: 


Episiotomy: none


Laceration: 1st degree


Other postpartum procedures: none


Postpartum complications: none


Discharge diagnosis: IUP at term delivered


 baby: female


Condition at discharge: Good


Disposition: 01 HOME / SELF CARE / HOMELESS





Plan





- Provider Discharge Summary


Activity: no sex for 6 weeks, no heavy lifting 4 weeks, no strenuous exercise


Diet: routine


Instructions: routine


Additional instructions: 


[]  Smoking cessation referral if applicable(refer to patient education folder 

for contact #)


[]  Refer to [facility]'s [facility] Booklet








Call your doctor immediately for:


* Fever > 100.5


* Heavy vaginal bleeding ( >1 pad per hour)


* Severe persistent headache


* Shortness of breath


* Reddened, hot, painful area to leg or breast


* Drainage or odor from incision.





* Keep incision clean and dry at all times and follow doctor's instructions 

regarding bathing/showering











- Follow up plan


Follow up: 


DANNY DAVIDSON MD [Primary Care Provider] - 7 Days